# Patient Record
Sex: FEMALE | Race: OTHER | NOT HISPANIC OR LATINO
[De-identification: names, ages, dates, MRNs, and addresses within clinical notes are randomized per-mention and may not be internally consistent; named-entity substitution may affect disease eponyms.]

---

## 2021-10-06 PROBLEM — Z00.00 ENCOUNTER FOR PREVENTIVE HEALTH EXAMINATION: Status: ACTIVE | Noted: 2021-10-06

## 2021-10-18 ENCOUNTER — NON-APPOINTMENT (OUTPATIENT)
Age: 71
End: 2021-10-18

## 2021-10-18 PROBLEM — Z86.018 HISTORY OF UTERINE LEIOMYOMA: Status: RESOLVED | Noted: 2021-10-18 | Resolved: 2021-10-18

## 2021-10-18 NOTE — HISTORY OF PRESENT ILLNESS
[FreeTextEntry1] : Problem list\par 1) Fibroids\par 2) Post menopausal bleeding. \par \par Results/Previous therapy \par 1) MRI pelvis 9/27/2021\par   a) Uterus- anteverted, lobular and heterogeneous w/ multiple fibroids (largest 1.8x1.5x1.6cm)\par   b) Cervix- heterogeneous mass versus edema or unusual fibroid measuring 3.2x3.3x3.0 cm. \par   c) Endometrium- abnormal for post menopausal female measuring up to 1cm in thickness. An intracavitary nodule, sybmucosal fibroid versus polyp measures 1.0x1.3x1.0cm. \par \par Patient is a 72 yo w/ hx of post menopausal bleeding, referred by Dr. Amaya for evaluation.

## 2021-10-18 NOTE — DISCUSSION/SUMMARY
[FreeTextEntry1] : 72 yo referred by Dr. Amaya with post menopausal bleeding and thickened endometrium.

## 2021-10-20 ENCOUNTER — APPOINTMENT (OUTPATIENT)
Dept: GYNECOLOGIC ONCOLOGY | Facility: CLINIC | Age: 71
End: 2021-10-20

## 2021-10-20 DIAGNOSIS — Z86.018 PERSONAL HISTORY OF OTHER BENIGN NEOPLASM: ICD-10-CM

## 2021-10-21 ENCOUNTER — NON-APPOINTMENT (OUTPATIENT)
Age: 71
End: 2021-10-21

## 2021-10-27 ENCOUNTER — APPOINTMENT (OUTPATIENT)
Dept: GYNECOLOGIC ONCOLOGY | Facility: CLINIC | Age: 71
End: 2021-10-27

## 2022-09-23 NOTE — ASU PATIENT PROFILE, ADULT - FALL HARM RISK - UNIVERSAL INTERVENTIONS
Bed in lowest position, wheels locked, appropriate side rails in place/Call bell, personal items and telephone in reach/Instruct patient to call for assistance before getting out of bed or chair/Non-slip footwear when patient is out of bed/[place] to call system/Physically safe environment - no spills, clutter or unnecessary equipment/Purposeful Proactive Rounding/Room/bathroom lighting operational, light cord in reach

## 2022-09-23 NOTE — ASU PATIENT PROFILE, ADULT - NS PREOP UNDERSTANDS INFO
npo after midnight, clears allowed up to 5am,bring id , insurance  card and pcr result DOS,wear comfortable clothing/yes

## 2022-09-25 ENCOUNTER — TRANSCRIPTION ENCOUNTER (OUTPATIENT)
Age: 72
End: 2022-09-25

## 2022-09-26 ENCOUNTER — RESULT REVIEW (OUTPATIENT)
Age: 72
End: 2022-09-26

## 2022-09-26 ENCOUNTER — OUTPATIENT (OUTPATIENT)
Dept: OUTPATIENT SERVICES | Facility: HOSPITAL | Age: 72
LOS: 1 days | Discharge: HOME | End: 2022-09-26

## 2022-09-26 ENCOUNTER — TRANSCRIPTION ENCOUNTER (OUTPATIENT)
Age: 72
End: 2022-09-26

## 2022-09-26 VITALS
HEIGHT: 65 IN | SYSTOLIC BLOOD PRESSURE: 125 MMHG | HEART RATE: 62 BPM | DIASTOLIC BLOOD PRESSURE: 67 MMHG | WEIGHT: 158.51 LBS | TEMPERATURE: 98 F | OXYGEN SATURATION: 99 % | RESPIRATION RATE: 16 BRPM

## 2022-09-26 VITALS
OXYGEN SATURATION: 98 % | DIASTOLIC BLOOD PRESSURE: 81 MMHG | HEART RATE: 81 BPM | SYSTOLIC BLOOD PRESSURE: 138 MMHG | TEMPERATURE: 98 F | RESPIRATION RATE: 16 BRPM

## 2022-09-26 DIAGNOSIS — Z98.890 OTHER SPECIFIED POSTPROCEDURAL STATES: Chronic | ICD-10-CM

## 2022-09-26 PROCEDURE — 88305 TISSUE EXAM BY PATHOLOGIST: CPT | Mod: 26

## 2022-09-26 RX ORDER — ATENOLOL AND CHLORTHALIDONE 50; 25 MG/1; MG/1
1 TABLET ORAL
Qty: 0 | Refills: 0 | DISCHARGE

## 2022-09-26 RX ORDER — DICLOFENAC SODIUM 30 MG/G
0 GEL TOPICAL
Qty: 0 | Refills: 0 | DISCHARGE

## 2022-09-26 RX ORDER — IBUPROFEN 200 MG
0 TABLET ORAL
Qty: 0 | Refills: 0 | DISCHARGE

## 2022-09-26 RX ORDER — ACETAMINOPHEN 500 MG
1000 TABLET ORAL ONCE
Refills: 0 | Status: COMPLETED | OUTPATIENT
Start: 2022-09-26 | End: 2022-09-26

## 2022-09-26 RX ORDER — COLCHICINE 0.6 MG
0 TABLET ORAL
Qty: 0 | Refills: 0 | DISCHARGE

## 2022-09-26 RX ORDER — SODIUM CHLORIDE 9 MG/ML
1000 INJECTION, SOLUTION INTRAVENOUS
Refills: 0 | Status: DISCONTINUED | OUTPATIENT
Start: 2022-09-26 | End: 2022-09-26

## 2022-09-26 RX ORDER — ACETAMINOPHEN 500 MG
0 TABLET ORAL
Qty: 0 | Refills: 0 | DISCHARGE

## 2022-09-26 RX ADMIN — Medication 1000 MILLIGRAM(S): at 07:55

## 2022-09-26 NOTE — BRIEF OPERATIVE NOTE - OPERATION/FINDINGS
Stenotic vagina with difficulty locating cervical os.  Friable tissue from area suspected to be cervix.

## 2022-09-26 NOTE — PRE-ANESTHESIA EVALUATION ADULT - NSANTHPMHFT_GEN_ALL_CORE
menometorrhagia, heavy, elevated IgA, HIGH CHOLESTEROL, can  walk 2 flights. can do 6 flights. rarely walks due to gout pain

## 2022-09-26 NOTE — ASU DISCHARGE PLAN (ADULT/PEDIATRIC) - CARE PROVIDER_API CALL
Warren Vang)  Obstetrics and Gynecology  [address] [place] [address]  Phone: [phone]  Fax: [phone]  Follow Up Time:

## 2022-09-26 NOTE — PRE-ANESTHESIA EVALUATION ADULT - NSANTHOSAYNRD_GEN_A_CORE
No. TACHO screening performed.  STOP BANG Legend: 0-2 = LOW Risk; 3-4 = INTERMEDIATE Risk; 5-8 = HIGH Risk

## 2022-09-26 NOTE — ASU DISCHARGE PLAN (ADULT/PEDIATRIC) - NS MD DC FALL RISK RISK
For information on Fall & Injury Prevention, visit: [email or web address] OR  [email or web address] OR  https://www.cdc.gov/steadi/patient.html

## 2022-09-27 LAB — SURGICAL PATHOLOGY STUDY: SIGNIFICANT CHANGE UP

## (undated) DEVICE — SLV COMPRESSION KNEE MED

## (undated) DEVICE — TUBING SET GRAVITY 2 SPIKE

## (undated) DEVICE — PACK D&C

## (undated) DEVICE — WARMING BLANKET UPPER ADULT

## (undated) DEVICE — POSITIONER STRAP ARMBOARD 1.5X32" DISP

## (undated) DEVICE — DRSG PAD SANITARY OB

## (undated) DEVICE — DRAPE TOWEL BLUE 17" X 24"

## (undated) DEVICE — DRAPE IRRIGATION POUCH 19X23"

## (undated) DEVICE — GLV 6.5 PROTEXIS (WHITE)

## (undated) DEVICE — SOL INJ NS 0.9% 1000ML

## (undated) DEVICE — POSITIONER FOAM EGG CRATE ULNAR 2PCS (PINK)